# Patient Record
Sex: MALE | Race: BLACK OR AFRICAN AMERICAN | NOT HISPANIC OR LATINO | Employment: UNEMPLOYED | URBAN - METROPOLITAN AREA
[De-identification: names, ages, dates, MRNs, and addresses within clinical notes are randomized per-mention and may not be internally consistent; named-entity substitution may affect disease eponyms.]

---

## 2018-01-11 PROCEDURE — 99284 EMERGENCY DEPT VISIT MOD MDM: CPT

## 2018-01-12 ENCOUNTER — HOSPITAL ENCOUNTER (EMERGENCY)
Facility: HOSPITAL | Age: 23
Discharge: HOME | End: 2018-01-12
Attending: EMERGENCY MEDICINE
Payer: MEDICAID

## 2018-01-12 VITALS
RESPIRATION RATE: 18 BRPM | DIASTOLIC BLOOD PRESSURE: 66 MMHG | HEART RATE: 105 BPM | SYSTOLIC BLOOD PRESSURE: 129 MMHG | BODY MASS INDEX: 19.25 KG/M2 | WEIGHT: 150 LBS | OXYGEN SATURATION: 97 % | HEIGHT: 74 IN | TEMPERATURE: 101 F

## 2018-01-12 DIAGNOSIS — R50.9 FEVER, UNSPECIFIED FEVER CAUSE: ICD-10-CM

## 2018-01-12 DIAGNOSIS — J10.1 INFLUENZA A: Primary | ICD-10-CM

## 2018-01-12 LAB
FLUAV AG SPEC QL IA: POSITIVE
FLUBV AG SPEC QL IA: NEGATIVE
SPECIMEN SOURCE: ABNORMAL

## 2018-01-12 PROCEDURE — 87400 INFLUENZA A/B EACH AG IA: CPT

## 2018-01-12 PROCEDURE — 25000003 PHARM REV CODE 250: Performed by: NURSE PRACTITIONER

## 2018-01-12 RX ORDER — IBUPROFEN 400 MG/1
800 TABLET ORAL
Status: COMPLETED | OUTPATIENT
Start: 2018-01-12 | End: 2018-01-12

## 2018-01-12 RX ORDER — OSELTAMIVIR PHOSPHATE 75 MG/1
75 CAPSULE ORAL 2 TIMES DAILY
Qty: 10 CAPSULE | Refills: 0 | Status: SHIPPED | OUTPATIENT
Start: 2018-01-12 | End: 2018-01-17

## 2018-01-12 RX ADMIN — IBUPROFEN 800 MG: 400 TABLET, FILM COATED ORAL at 01:01

## 2018-01-12 NOTE — DISCHARGE INSTRUCTIONS
Follow-up with your primary physician as needed.    Take Tamiflu twice daily for 5 days. Use ibuprofen and Tylenol as needed for fevers.    Return to the emergency department for any new or worsening symptoms.

## 2018-01-12 NOTE — ED PROVIDER NOTES
"Encounter Date: 1/11/2018    SCRIBE #1 NOTE: I, Gary Grace, am scribing for, and in the presence of,  Justen De Jesus NP. I have scribed the following portions of the note - Other sections scribed: HPI and ROS.       History     Chief Complaint   Patient presents with    Generalized Body Aches     " throat hurt, my head my nose stuff up, cold sweats" c/o nasal congestion, sore throat, HA, body ache since this week      CC: Fatigue    HPI: Patient is 22 y.o. male with no pertinent PMHx who presents to ED c/o productive cough with associated chills, fever, sore throat, otalgia, fatigue, and congestion since yesterday. No sick contact reported. Denies nausea, vomiting, abdominal pain, chest pain. No further complaints.      The history is provided by the patient. No  was used.     Review of patient's allergies indicates:  No Known Allergies  Past Medical History:   Diagnosis Date    Asthma      History reviewed. No pertinent surgical history.  History reviewed. No pertinent family history.  Social History   Substance Use Topics    Smoking status: Never Smoker    Smokeless tobacco: Not on file    Alcohol use No     Review of Systems   Constitutional: Positive for chills, fatigue and fever.   HENT: Positive for ear pain and sore throat.    Eyes: Negative for visual disturbance.   Respiratory: Positive for cough (productive). Negative for shortness of breath.    Cardiovascular: Negative for chest pain.   Gastrointestinal: Negative for abdominal pain, diarrhea, nausea and vomiting.   Genitourinary: Negative for dysuria.   Musculoskeletal: Negative for back pain.   Skin: Negative for rash.   Neurological: Negative for weakness and headaches.   Hematological: Does not bruise/bleed easily.       Physical Exam     Initial Vitals [01/11/18 2338]   BP Pulse Resp Temp SpO2   (!) 142/74 99 20 (!) 101 °F (38.3 °C) 96 %      MAP       96.67         Physical Exam    Nursing note and vitals " reviewed.  Constitutional: He appears well-developed and well-nourished. He is not diaphoretic. He is active and cooperative.  Non-toxic appearance. He does not have a sickly appearance. He appears ill. No distress.   HENT:   Head: Normocephalic and atraumatic.   Right Ear: External ear normal.   Left Ear: External ear normal.   Nose: Nose normal.   Eyes: Conjunctivae and EOM are normal. Pupils are equal, round, and reactive to light. Right eye exhibits no discharge. Left eye exhibits no discharge. No scleral icterus.   Neck: Normal range of motion. Neck supple. No thyromegaly present. No tracheal deviation present. No JVD present.   Cardiovascular: Regular rhythm, normal heart sounds and intact distal pulses. Tachycardia present.  Exam reveals no gallop and no friction rub.    No murmur heard.  Pulmonary/Chest: Effort normal and breath sounds normal. No accessory muscle usage or stridor. No respiratory distress. He has no decreased breath sounds. He has no wheezes. He has no rhonchi. He has no rales. He exhibits no tenderness.   Abdominal: Soft. Bowel sounds are normal. He exhibits no distension and no mass. There is no tenderness. There is no rigidity, no rebound, no guarding, no tenderness at McBurney's point and negative Michelle's sign.   Musculoskeletal: Normal range of motion. He exhibits no edema or tenderness.   Lymphadenopathy:     He has no cervical adenopathy.   Neurological: He is alert and oriented to person, place, and time. He has normal strength and normal reflexes. He displays normal reflexes. No cranial nerve deficit or sensory deficit.   Skin: Skin is warm and dry. No rash and no abscess noted. No erythema. No pallor.   Psychiatric: He has a normal mood and affect. His behavior is normal. Judgment and thought content normal.         ED Course   Procedures  Labs Reviewed   INFLUENZA A AND B ANTIGEN - Abnormal; Notable for the following:        Result Value    Influenza A Ag, EIA Positive (*)     All  other components within normal limits             Medical Decision Making:   Differential Diagnosis:   Includes otitis media, otitis externa, pharyngitis, meningitis, influenza, gastroenteritis, bacteremia, sepsis, others  Clinical Tests:   Lab Tests: Ordered and Reviewed  ED Management:  22-year-old male presenting for evaluation of cough, generalized bodyaches, malaise, sore throat. Denies any additional symptoms. Symptoms began 2 days ago. Patient is febrile and slightly tachycardic. He is ill-appearing but nontoxic and in no distress. TMs and auditory canals are normal bilaterally. There is no oropharyngeal erythema, edema, or exudate. Uvula is midline. Lungs sounds are clear bilaterally in all fields. Abdomen is soft and nontender without rigidity or guarding. Rapid influenza swab is positive for influenza A. Given patient's vital signs and appearance of the patient I doubt sepsis. Will treat with Tamiflu. Instructed patient to use ibuprofen and/or acetaminophen as needed for fever and body aches. ED return precautions given. Patient expressed understanding of diagnosis and discharge instructions.   Other:   I have discussed this case with another health care provider.       <> Summary of the Discussion: Case discussed with my attending physician who agreed with the assessment and plan.            Scribe Attestation:   Scribe #1: I performed the above scribed service and the documentation accurately describes the services I performed. I attest to the accuracy of the note.    Attending Attestation:           Physician Attestation for Scribe:  Physician Attestation Statement for Scribe #1: I, Justen De Jesus NP, reviewed documentation, as scribed by Gary Grace in my presence, and it is both accurate and complete.                 ED Course      Clinical Impression:   The primary encounter diagnosis was Influenza A. A diagnosis of Fever, unspecified fever cause was also pertinent to this visit.    Disposition:    Disposition: Discharged  Condition: Stable                        Justen De Jeuss NP  01/12/18 0210

## 2018-01-12 NOTE — ED TRIAGE NOTES
"Pt presents to ER with c/o headache, chills, runny nose, sore throat, frequent productive cough x 2 days. Rates pain 5/10. Pt took generic cold and flu medicine today.   Vitals:    01/11/18 2338   BP: (!) 142/74   BP Location: Left arm   Patient Position: Sitting   Pulse: 99   Resp: 20   Temp: (!) 101 °F (38.3 °C)   TempSrc: Oral   SpO2: 96%   Weight: 68 kg (150 lb)   Height: 6' 2" (1.88 m)     "

## 2019-11-01 ENCOUNTER — HOSPITAL ENCOUNTER (EMERGENCY)
Facility: HOSPITAL | Age: 24
Discharge: HOME | End: 2019-11-01
Attending: EMERGENCY MEDICINE
Payer: MEDICAID

## 2019-11-01 VITALS
HEART RATE: 49 BPM | HEIGHT: 74 IN | BODY MASS INDEX: 21.43 KG/M2 | DIASTOLIC BLOOD PRESSURE: 66 MMHG | TEMPERATURE: 98 F | RESPIRATION RATE: 18 BRPM | WEIGHT: 167 LBS | OXYGEN SATURATION: 99 % | SYSTOLIC BLOOD PRESSURE: 134 MMHG

## 2019-11-01 DIAGNOSIS — R07.89 CHEST PAIN, NON-CARDIAC: Primary | ICD-10-CM

## 2019-11-01 PROCEDURE — 99283 EMERGENCY DEPT VISIT LOW MDM: CPT | Mod: 25

## 2019-11-01 RX ORDER — IBUPROFEN 600 MG/1
600 TABLET ORAL EVERY 6 HOURS PRN
Qty: 20 TABLET | Refills: 0 | Status: SHIPPED | OUTPATIENT
Start: 2019-11-01

## 2019-11-01 RX ORDER — IPRATROPIUM BROMIDE AND ALBUTEROL SULFATE 2.5; .5 MG/3ML; MG/3ML
3 SOLUTION RESPIRATORY (INHALATION)
Status: DISCONTINUED | OUTPATIENT
Start: 2019-11-01 | End: 2019-11-01

## 2019-11-01 RX ORDER — ALBUTEROL SULFATE 2.5 MG/.5ML
5 SOLUTION RESPIRATORY (INHALATION)
Status: DISCONTINUED | OUTPATIENT
Start: 2019-11-01 | End: 2019-11-01

## 2019-11-01 NOTE — ED PROVIDER NOTES
Encounter Date: 11/1/2019    SCRIBE #1 NOTE: I, Keiko Chiang, am scribing for, and in the presence of,  Mitchel Fofana MD. I have scribed the following portions of the note - Other sections scribed: HPI, ELLIS.       History     Chief Complaint   Patient presents with    Chest Pain     pt c/o (L) sided CP that started late last night / early this AM; does not radiate; pt says pain is sharp when he leans up and says it feels better when he lies down; denies SOB     24 year old male patient presents to the ED complaining of left lateral thoracic chest pain that began early this morning while the patient was asleep. The patient reports that leaning forward exacerbates the chest pain. He denies  fever, chills, diaphoresis, headache, eye problem, otalgia, sore throat, cough, shortness of breath, abdominal pain, emesis, diarrhea, painful urination, arm/leg problems, or rash. He reports that he has no known drug allergies, and does not smoke or drink.     The history is provided by the patient.     Review of patient's allergies indicates:  No Known Allergies  Past Medical History:   Diagnosis Date    Asthma      History reviewed. No pertinent surgical history.  History reviewed. No pertinent family history.  Social History     Tobacco Use    Smoking status: Never Smoker   Substance Use Topics    Alcohol use: No    Drug use: Not on file     Review of Systems   Constitutional: Negative for chills, diaphoresis and fever.   HENT: Negative for ear pain and sore throat.    Eyes: Negative for visual disturbance.   Respiratory: Negative for cough and shortness of breath.    Cardiovascular: Positive for chest pain (Left lateral thoracic).   Gastrointestinal: Negative for abdominal pain, diarrhea and vomiting.   Genitourinary: Negative for dysuria.   Musculoskeletal: Negative for arthralgias and myalgias.   Skin: Negative for rash.   Neurological: Negative for headaches.       Physical Exam     Initial Vitals [11/01/19 0749]   BP  Pulse Resp Temp SpO2   (!) 142/77 62 18 98.3 °F (36.8 °C) 100 %      MAP       --         Physical Exam  The patient was examined specifically for the following:   General:No significant distress, Good color, Warm and dry. Head and neck:Scalp atraumatic, Neck supple. Neurological:Appropriate conversation, Gross motor deficits. Eyes:Conjugate gaze, Clear corneas. ENT: No epistaxis. Cardiac: Regular rate and rhythm, Grossly normal heart tones. Pulmonary: Wheezing, Rales. Gastrointestinal: Abdominal tenderness, Abdominal distention. Musculoskeletal: Extremity deformity, Apparent pain with range of motion of the joints. Skin: Rash.   The findings on examination were normal .  Lungs are clear.  The heart tones are normal.  The abdomen is soft.  Extremities are nontender.  There is no swelling of the lower extremities. The patient is in no respiratory distress  ED Course   Procedures  Labs Reviewed - No data to display  EKG Readings: (Independently Interpreted)   This patient is in a normal sinus rhythm with a heart rate of 61.  There are changes of early repolarization.  There is no evidence of acute myocardial infarction or malignant arrhythmia.  The patient has a normal axis.       Imaging Results          X-Ray Chest AP Portable (Final result)  Result time 11/01/19 09:27:16    Final result by Rupesh Martinez MD (11/01/19 09:27:16)                 Impression:      No acute chest disease identified.      Electronically signed by: Rupesh Martinez MD  Date:    11/01/2019  Time:    09:27             Narrative:    EXAMINATION:  XR CHEST AP PORTABLE    CLINICAL HISTORY:  chest pain;    TECHNIQUE:  Single frontal view of the chest was performed.    COMPARISON:  None    FINDINGS:  The heart is not enlarged.  Superior mediastinal structures are unremarkable.  Pulmonary vasculature is within normal limits.  The lungs are free of focal consolidations.  There is no evidence for pneumothorax or large pleural effusions.  Bony  structures are grossly intact.                              Medical decision making:  Given the above this patient has sharp positional left-sided chest pain. I believe this is musculoskeletal.  The patient is not tachycardic or short of breath.  He is perc negative.  He has no lower extremity swelling. He is otherwise well. EKG fails to reveal ischemia.  There is no cardiac arrhythmia.  I doubt pericarditis myocarditis.  Chest x-ray fails to reveal pneumothorax pneumonia pleural effusion.                Scribe Attestation:   Scribe #1: I performed the above scribed service and the documentation accurately describes the services I performed. I attest to the accuracy of the note.               Clinical Impression:       ICD-10-CM ICD-9-CM   1. Chest pain, non-cardiac R07.89 786.59            I personally performed the services described in this documentation.  All medical record  entries made by the scribe are at my direction and in my presence.  Signed, Dr. Brigido Fofana MD  11/01/19 7777

## 2019-11-01 NOTE — DISCHARGE INSTRUCTIONS
Ibuprofen as directed.  Please return immediately if you get worse or if new problems develop.  You may use a heating pad.  No heavy lifting.  Rest.  Please follow-up with her primary care doctor next week

## 2019-11-01 NOTE — ED TRIAGE NOTES
24 y.o. Male presents to the ED with chief complaint of chest pain starting yesterday. PT states pain localized to Left chest with increased pain when leaning forward. Denies recent injuries. Pt resting in bed in NAD. Side rails up x2. AAOx4. Denies OTC med use.